# Patient Record
Sex: FEMALE | Race: OTHER | HISPANIC OR LATINO | URBAN - METROPOLITAN AREA
[De-identification: names, ages, dates, MRNs, and addresses within clinical notes are randomized per-mention and may not be internally consistent; named-entity substitution may affect disease eponyms.]

---
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
## 2019-03-19 PROBLEM — Z00.00 ENCOUNTER FOR PREVENTIVE HEALTH EXAMINATION: Status: ACTIVE | Noted: 2019-03-19

## 2019-03-21 ENCOUNTER — ASOB RESULT (OUTPATIENT)
Age: 19
End: 2019-03-21

## 2019-03-21 ENCOUNTER — APPOINTMENT (OUTPATIENT)
Age: 19
End: 2019-03-21
Payer: MEDICAID

## 2019-03-21 PROCEDURE — 76816 OB US FOLLOW-UP PER FETUS: CPT

## 2019-04-18 ENCOUNTER — ASOB RESULT (OUTPATIENT)
Age: 19
End: 2019-04-18

## 2019-04-18 ENCOUNTER — APPOINTMENT (OUTPATIENT)
Dept: ANTEPARTUM | Facility: CLINIC | Age: 19
End: 2019-04-18
Payer: MEDICAID

## 2019-04-18 PROCEDURE — 76816 OB US FOLLOW-UP PER FETUS: CPT

## 2019-04-18 PROCEDURE — 76819 FETAL BIOPHYS PROFIL W/O NST: CPT

## 2019-05-17 ENCOUNTER — INPATIENT (INPATIENT)
Facility: HOSPITAL | Age: 19
LOS: 4 days | Discharge: HOME | End: 2019-05-22
Attending: OBSTETRICS & GYNECOLOGY | Admitting: OBSTETRICS & GYNECOLOGY
Payer: MEDICAID

## 2019-05-17 VITALS
TEMPERATURE: 98 F | HEART RATE: 90 BPM | SYSTOLIC BLOOD PRESSURE: 112 MMHG | DIASTOLIC BLOOD PRESSURE: 60 MMHG | RESPIRATION RATE: 17 BRPM

## 2019-05-17 DIAGNOSIS — O47.1 FALSE LABOR AT OR AFTER 37 COMPLETED WEEKS OF GESTATION: ICD-10-CM

## 2019-05-17 LAB
APPEARANCE UR: CLEAR — SIGNIFICANT CHANGE UP
BASOPHILS # BLD AUTO: 0 K/UL — SIGNIFICANT CHANGE UP (ref 0–0.2)
BASOPHILS NFR BLD AUTO: 0.1 % — SIGNIFICANT CHANGE UP (ref 0–2)
BILIRUB UR-MCNC: NEGATIVE — SIGNIFICANT CHANGE UP
COLOR SPEC: YELLOW — SIGNIFICANT CHANGE UP
DIFF PNL FLD: NEGATIVE — SIGNIFICANT CHANGE UP
EOSINOPHIL # BLD AUTO: 0.1 K/UL — SIGNIFICANT CHANGE UP (ref 0–0.5)
EOSINOPHIL NFR BLD AUTO: 0.8 % — SIGNIFICANT CHANGE UP (ref 0–6)
GLUCOSE UR QL: NEGATIVE MG/DL — SIGNIFICANT CHANGE UP
HCT VFR BLD CALC: 40.1 % — SIGNIFICANT CHANGE UP (ref 37–47)
HGB BLD-MCNC: 13.4 G/DL — SIGNIFICANT CHANGE UP (ref 12–16)
KETONES UR-MCNC: NEGATIVE — SIGNIFICANT CHANGE UP
LEUKOCYTE ESTERASE UR-ACNC: NEGATIVE — SIGNIFICANT CHANGE UP
LYMPHOCYTES # BLD AUTO: 17.8 % — LOW (ref 20–55)
LYMPHOCYTES # BLD AUTO: 2 K/UL — SIGNIFICANT CHANGE UP (ref 1–4.8)
MCHC RBC-ENTMCNC: 30.1 PG — SIGNIFICANT CHANGE UP (ref 27–31)
MCHC RBC-ENTMCNC: 33.4 G/DL — SIGNIFICANT CHANGE UP (ref 32–36)
MCV RBC AUTO: 90.1 FL — SIGNIFICANT CHANGE UP (ref 81–99)
MONOCYTES # BLD AUTO: 1.3 K/UL — HIGH (ref 0–0.8)
MONOCYTES NFR BLD AUTO: 11.3 % — HIGH (ref 3–10)
NEUTROPHILS # BLD AUTO: 8 K/UL — SIGNIFICANT CHANGE UP (ref 1.8–8)
NEUTROPHILS NFR BLD AUTO: 69.7 % — SIGNIFICANT CHANGE UP (ref 37–73)
NITRITE UR-MCNC: NEGATIVE — SIGNIFICANT CHANGE UP
PH UR: 7 — SIGNIFICANT CHANGE UP (ref 5–8)
PLATELET # BLD AUTO: 241 K/UL — SIGNIFICANT CHANGE UP (ref 150–400)
PROT UR-MCNC: NEGATIVE MG/DL — SIGNIFICANT CHANGE UP
RBC # BLD: 4.45 M/UL — SIGNIFICANT CHANGE UP (ref 4.4–5.2)
RBC # FLD: 13.5 % — SIGNIFICANT CHANGE UP (ref 11–15.6)
SP GR SPEC: 1 — LOW (ref 1.01–1.02)
UROBILINOGEN FLD QL: NEGATIVE MG/DL — SIGNIFICANT CHANGE UP
WBC # BLD: 11.5 K/UL — HIGH (ref 4.8–10.8)
WBC # FLD AUTO: 11.5 K/UL — HIGH (ref 4.8–10.8)

## 2019-05-17 RX ORDER — OXYTOCIN 10 UNIT/ML
333.33 VIAL (ML) INJECTION
Qty: 20 | Refills: 0 | Status: DISCONTINUED | OUTPATIENT
Start: 2019-05-17 | End: 2019-05-17

## 2019-05-17 RX ORDER — CITRIC ACID/SODIUM CITRATE 300-500 MG
30 SOLUTION, ORAL ORAL ONCE
Refills: 0 | Status: DISCONTINUED | OUTPATIENT
Start: 2019-05-17 | End: 2019-05-17

## 2019-05-17 RX ORDER — SODIUM CHLORIDE 9 MG/ML
1000 INJECTION, SOLUTION INTRAVENOUS
Refills: 0 | Status: DISCONTINUED | OUTPATIENT
Start: 2019-05-17 | End: 2019-05-19

## 2019-05-17 RX ORDER — SODIUM CHLORIDE 9 MG/ML
1000 INJECTION, SOLUTION INTRAVENOUS
Refills: 0 | Status: DISCONTINUED | OUTPATIENT
Start: 2019-05-17 | End: 2019-05-17

## 2019-05-17 RX ORDER — CITRIC ACID/SODIUM CITRATE 300-500 MG
30 SOLUTION, ORAL ORAL ONCE
Refills: 0 | Status: COMPLETED | OUTPATIENT
Start: 2019-05-17 | End: 2019-05-19

## 2019-05-17 RX ORDER — OXYTOCIN 10 UNIT/ML
333.33 VIAL (ML) INJECTION
Qty: 20 | Refills: 0 | Status: DISCONTINUED | OUTPATIENT
Start: 2019-05-17 | End: 2019-05-22

## 2019-05-17 RX ADMIN — SODIUM CHLORIDE 125 MILLILITER(S): 9 INJECTION, SOLUTION INTRAVENOUS at 22:30

## 2019-05-17 NOTE — OB PROVIDER H&P - NSHPPHYSICALEXAM_GEN_ALL_CORE
Vital Signs Last 24 Hrs  T(C): 36.8 (17 May 2019 22:06), Max: 36.8 (17 May 2019 21:32)  T(F): 98.3 (17 May 2019 22:06), Max: 98.3 (17 May 2019 21:32)  HR: 90 (17 May 2019 22:06) (90 - 90)  BP: 112/60 (17 May 2019 22:06) (112/60 - 112/60)  RR: 18 (17 May 2019 22:06) (17 - 18)    Gen: NAD, lying in bed  CV: rrr, nl s1s2  Lungs: CTABL   Abd: soft, gravid, NT  Ext: no cyanosis, no edema  Speculum: pooling noted, cervix appears closed, no bleeding noted  CE: closed/long/posterior   Fhr: baseline 140 mod viet, +accels, no decels  [place]: irregular ctx  Sono: vertex  Nitrazine positive

## 2019-05-17 NOTE — OB PROVIDER H&P - HISTORY OF PRESENT ILLNESS
18 yo  at 39 weeks, LMP 08/10/2018, presents to L&D complaining of leakage of fluids since this morning (6am), accompanied by vaginal bleeding, reports the fluid is clear and reports having lower back pain but no contractions. Denies any fever, chills, dysuria, reports + FM.   Pmhx: none  Pshx: none  Allergies: none  Meds: PNV  Prenatal: HRH  Family: noncontributory

## 2019-05-17 NOTE — OB PROVIDER H&P - ASSESSMENT
A 18 yo  at 39 weeks, presents to L&D c/o leakage of fluid and vaginal bleeding, being admitted for PROM, + Nitrazine test and pooling noted in speculum exam    Plan  -Admit to L&D, continue to monitor vs, fhr, toco  -Routine blood work pending  -Re-eval PRN  -Will start induction with Cytotec    D/w Dr. Marcus

## 2019-05-17 NOTE — OB RN TRIAGE NOTE - CHIEF COMPLAINT QUOTE
"This morning when I woke up and had water coming down that I couldn't stop and I have bleeding around 630pm"

## 2019-05-18 ENCOUNTER — TRANSCRIPTION ENCOUNTER (OUTPATIENT)
Age: 19
End: 2019-05-18

## 2019-05-18 LAB
ABO RH CONFIRMATION: SIGNIFICANT CHANGE UP
BLD GP AB SCN SERPL QL: SIGNIFICANT CHANGE UP
TYPE + AB SCN PNL BLD: SIGNIFICANT CHANGE UP

## 2019-05-18 RX ORDER — AMPICILLIN TRIHYDRATE 250 MG
1 CAPSULE ORAL EVERY 4 HOURS
Refills: 0 | Status: DISCONTINUED | OUTPATIENT
Start: 2019-05-18 | End: 2019-05-18

## 2019-05-18 RX ORDER — OXYTOCIN 10 UNIT/ML
2 VIAL (ML) INJECTION
Qty: 30 | Refills: 0 | Status: DISCONTINUED | OUTPATIENT
Start: 2019-05-18 | End: 2019-05-19

## 2019-05-18 RX ORDER — AMPICILLIN TRIHYDRATE 250 MG
2 CAPSULE ORAL ONCE
Refills: 0 | Status: DISCONTINUED | OUTPATIENT
Start: 2019-05-18 | End: 2019-05-18

## 2019-05-18 RX ADMIN — SODIUM CHLORIDE 125 MILLILITER(S): 9 INJECTION, SOLUTION INTRAVENOUS at 20:00

## 2019-05-18 RX ADMIN — SODIUM CHLORIDE 125 MILLILITER(S): 9 INJECTION, SOLUTION INTRAVENOUS at 05:00

## 2019-05-18 RX ADMIN — Medication 2 MILLIUNIT(S)/MIN: at 19:08

## 2019-05-18 NOTE — OB RN DELIVERY SUMMARY - NSDELIVERYTYPEA_OBGYN_ALL_OB
Grant Lynn's mother called clinic at 11:09 this morning to cancel his appointment with Danielle Ritchie that was scheduled for 11:30.  Writer explained again the no-show policy and that we request a two hour notice of cancellation.  Caller stated she was unaware of this and apologized.  Writer also explained that habitual issues for late cancels could result in dismissal, as it has in the past with other providers as well.  Caller stated understanding.    Delivery

## 2019-05-18 NOTE — CHART NOTE - NSCHARTNOTEFT_GEN_A_CORE
IOl for prom  on po cytotec  minimal pain  cx clp on admission  cx 2/50  cat 1  dc cytotec  start pitocin.

## 2019-05-18 NOTE — CHART NOTE - NSCHARTNOTEFT_GEN_A_CORE
Pt is seen and examined at bedside, conformable resting in bed, reports mild pain, no pain medication at this time    Vital Signs Last 24 Hrs  T(C): 36.7 (18 May 2019 01:55), Max: 36.8 (17 May 2019 21:32)  T(F): 98.06 (18 May 2019 01:55), Max: 98.3 (17 May 2019 21:32)  HR: 76 (18 May 2019 01:55) (76 - 90)  BP: 111/59 (18 May 2019 01:55) (108/56 - 115/56)  RR: 17 (18 May 2019 01:55) (17 - 18)    FETAL HEART RATE: 1340, moderate variability, + accelerations, no deceleration,   [place]: contractions irregular  CERVICAL EXAM: Deferred    INTERVENTIONS:   -c/w Cytotec 40mg 0/3   -Re asses PRN  -Vaginal delivery expected    D/w Dr. Marcus.

## 2019-05-19 ENCOUNTER — TRANSCRIPTION ENCOUNTER (OUTPATIENT)
Age: 19
End: 2019-05-19

## 2019-05-19 DIAGNOSIS — O36.8390 MATERNAL CARE FOR ABNORMALITIES OF THE FETAL HEART RATE OR RHYTHM, UNSPECIFIED TRIMESTER, NOT APPLICABLE OR UNSPECIFIED: ICD-10-CM

## 2019-05-19 LAB
BASOPHILS # BLD AUTO: 0 K/UL — SIGNIFICANT CHANGE UP (ref 0–0.2)
BASOPHILS NFR BLD AUTO: 0.1 % — SIGNIFICANT CHANGE UP (ref 0–2)
EOSINOPHIL # BLD AUTO: 0 K/UL — SIGNIFICANT CHANGE UP (ref 0–0.5)
EOSINOPHIL NFR BLD AUTO: 0.2 % — SIGNIFICANT CHANGE UP (ref 0–6)
HCT VFR BLD CALC: 37.9 % — SIGNIFICANT CHANGE UP (ref 37–47)
HGB BLD-MCNC: 12.5 G/DL — SIGNIFICANT CHANGE UP (ref 12–16)
LYMPHOCYTES # BLD AUTO: 1 K/UL — SIGNIFICANT CHANGE UP (ref 1–4.8)
LYMPHOCYTES # BLD AUTO: 6.8 % — LOW (ref 20–55)
MCHC RBC-ENTMCNC: 30 PG — SIGNIFICANT CHANGE UP (ref 27–31)
MCHC RBC-ENTMCNC: 33 G/DL — SIGNIFICANT CHANGE UP (ref 32–36)
MCV RBC AUTO: 91.1 FL — SIGNIFICANT CHANGE UP (ref 81–99)
MEV IGG SER-ACNC: 7.1 AU/ML — SIGNIFICANT CHANGE UP
MEV IGG+IGM SER-IMP: NEGATIVE — SIGNIFICANT CHANGE UP
MONOCYTES # BLD AUTO: 1.3 K/UL — HIGH (ref 0–0.8)
MONOCYTES NFR BLD AUTO: 8.8 % — SIGNIFICANT CHANGE UP (ref 3–10)
NEUTROPHILS # BLD AUTO: 12.2 K/UL — HIGH (ref 1.8–8)
NEUTROPHILS NFR BLD AUTO: 83.8 % — HIGH (ref 37–73)
PLATELET # BLD AUTO: 215 K/UL — SIGNIFICANT CHANGE UP (ref 150–400)
RBC # BLD: 4.16 M/UL — LOW (ref 4.4–5.2)
RBC # FLD: 13.7 % — SIGNIFICANT CHANGE UP (ref 11–15.6)
T PALLIDUM AB TITR SER: NEGATIVE — SIGNIFICANT CHANGE UP
WBC # BLD: 14.5 K/UL — HIGH (ref 4.8–10.8)
WBC # FLD AUTO: 14.5 K/UL — HIGH (ref 4.8–10.8)

## 2019-05-19 RX ORDER — GLYCERIN ADULT
1 SUPPOSITORY, RECTAL RECTAL AT BEDTIME
Refills: 0 | Status: DISCONTINUED | OUTPATIENT
Start: 2019-05-19 | End: 2019-05-22

## 2019-05-19 RX ORDER — SODIUM CHLORIDE 9 MG/ML
1000 INJECTION, SOLUTION INTRAVENOUS
Refills: 0 | Status: DISCONTINUED | OUTPATIENT
Start: 2019-05-19 | End: 2019-05-22

## 2019-05-19 RX ORDER — ACETAMINOPHEN 500 MG
1000 TABLET ORAL ONCE
Refills: 0 | Status: COMPLETED | OUTPATIENT
Start: 2019-05-19 | End: 2019-05-19

## 2019-05-19 RX ORDER — KETOROLAC TROMETHAMINE 30 MG/ML
30 SYRINGE (ML) INJECTION EVERY 6 HOURS
Refills: 0 | Status: DISCONTINUED | OUTPATIENT
Start: 2019-05-19 | End: 2019-05-21

## 2019-05-19 RX ORDER — ENOXAPARIN SODIUM 100 MG/ML
40 INJECTION SUBCUTANEOUS EVERY 24 HOURS
Refills: 0 | Status: DISCONTINUED | OUTPATIENT
Start: 2019-05-19 | End: 2019-05-22

## 2019-05-19 RX ORDER — SIMETHICONE 80 MG/1
80 TABLET, CHEWABLE ORAL EVERY 4 HOURS
Refills: 0 | Status: DISCONTINUED | OUTPATIENT
Start: 2019-05-19 | End: 2019-05-22

## 2019-05-19 RX ORDER — IBUPROFEN 200 MG
600 TABLET ORAL EVERY 6 HOURS
Refills: 0 | Status: COMPLETED | OUTPATIENT
Start: 2019-05-19 | End: 2020-04-16

## 2019-05-19 RX ORDER — KETOROLAC TROMETHAMINE 30 MG/ML
30 SYRINGE (ML) INJECTION ONCE
Refills: 0 | Status: DISCONTINUED | OUTPATIENT
Start: 2019-05-19 | End: 2019-05-19

## 2019-05-19 RX ORDER — ONDANSETRON 8 MG/1
4 TABLET, FILM COATED ORAL EVERY 6 HOURS
Refills: 0 | Status: DISCONTINUED | OUTPATIENT
Start: 2019-05-19 | End: 2019-05-22

## 2019-05-19 RX ORDER — OXYTOCIN 10 UNIT/ML
333.33 VIAL (ML) INJECTION
Qty: 20 | Refills: 0 | Status: DISCONTINUED | OUTPATIENT
Start: 2019-05-19 | End: 2019-05-22

## 2019-05-19 RX ORDER — DIPHENHYDRAMINE HCL 50 MG
25 CAPSULE ORAL EVERY 4 HOURS
Refills: 0 | Status: DISCONTINUED | OUTPATIENT
Start: 2019-05-19 | End: 2019-05-22

## 2019-05-19 RX ORDER — ACETAMINOPHEN 500 MG
975 TABLET ORAL EVERY 6 HOURS
Refills: 0 | Status: DISCONTINUED | OUTPATIENT
Start: 2019-05-19 | End: 2019-05-22

## 2019-05-19 RX ORDER — OXYCODONE HYDROCHLORIDE 5 MG/1
5 TABLET ORAL ONCE
Refills: 0 | Status: DISCONTINUED | OUTPATIENT
Start: 2019-05-19 | End: 2019-05-22

## 2019-05-19 RX ORDER — CEFAZOLIN SODIUM 1 G
2000 VIAL (EA) INJECTION ONCE
Refills: 0 | Status: COMPLETED | OUTPATIENT
Start: 2019-05-19 | End: 2019-05-19

## 2019-05-19 RX ORDER — AZITHROMYCIN 500 MG/1
500 TABLET, FILM COATED ORAL ONCE
Refills: 0 | Status: COMPLETED | OUTPATIENT
Start: 2019-05-19 | End: 2019-05-19

## 2019-05-19 RX ORDER — FENTANYL CITRATE 50 UG/ML
50 INJECTION INTRAVENOUS
Refills: 0 | Status: DISCONTINUED | OUTPATIENT
Start: 2019-05-19 | End: 2019-05-19

## 2019-05-19 RX ORDER — MAGNESIUM HYDROXIDE 400 MG/1
30 TABLET, CHEWABLE ORAL
Refills: 0 | Status: DISCONTINUED | OUTPATIENT
Start: 2019-05-19 | End: 2019-05-22

## 2019-05-19 RX ORDER — ONDANSETRON 8 MG/1
4 TABLET, FILM COATED ORAL ONCE
Refills: 0 | Status: DISCONTINUED | OUTPATIENT
Start: 2019-05-19 | End: 2019-05-19

## 2019-05-19 RX ORDER — LANOLIN
1 OINTMENT (GRAM) TOPICAL EVERY 6 HOURS
Refills: 0 | Status: DISCONTINUED | OUTPATIENT
Start: 2019-05-19 | End: 2019-05-22

## 2019-05-19 RX ORDER — BUTORPHANOL TARTRATE 2 MG/ML
2 INJECTION, SOLUTION INTRAMUSCULAR; INTRAVENOUS ONCE
Refills: 0 | Status: DISCONTINUED | OUTPATIENT
Start: 2019-05-19 | End: 2019-05-19

## 2019-05-19 RX ORDER — FENTANYL CITRATE 50 UG/ML
25 INJECTION INTRAVENOUS
Refills: 0 | Status: DISCONTINUED | OUTPATIENT
Start: 2019-05-19 | End: 2019-05-19

## 2019-05-19 RX ORDER — SODIUM CHLORIDE 9 MG/ML
1000 INJECTION INTRAMUSCULAR; INTRAVENOUS; SUBCUTANEOUS
Refills: 0 | Status: DISCONTINUED | OUTPATIENT
Start: 2019-05-19 | End: 2019-05-19

## 2019-05-19 RX ORDER — DIPHENHYDRAMINE HCL 50 MG
25 CAPSULE ORAL EVERY 6 HOURS
Refills: 0 | Status: DISCONTINUED | OUTPATIENT
Start: 2019-05-19 | End: 2019-05-22

## 2019-05-19 RX ORDER — DOCUSATE SODIUM 100 MG
100 CAPSULE ORAL
Refills: 0 | Status: DISCONTINUED | OUTPATIENT
Start: 2019-05-19 | End: 2019-05-22

## 2019-05-19 RX ORDER — NALOXONE HYDROCHLORIDE 4 MG/.1ML
0.1 SPRAY NASAL
Refills: 0 | Status: DISCONTINUED | OUTPATIENT
Start: 2019-05-19 | End: 2019-05-22

## 2019-05-19 RX ORDER — OXYCODONE HYDROCHLORIDE 5 MG/1
5 TABLET ORAL
Refills: 0 | Status: DISCONTINUED | OUTPATIENT
Start: 2019-05-19 | End: 2019-05-22

## 2019-05-19 RX ORDER — SODIUM CHLORIDE 9 MG/ML
1000 INJECTION, SOLUTION INTRAVENOUS
Refills: 0 | Status: DISCONTINUED | OUTPATIENT
Start: 2019-05-19 | End: 2019-05-19

## 2019-05-19 RX ORDER — TETANUS TOXOID, REDUCED DIPHTHERIA TOXOID AND ACELLULAR PERTUSSIS VACCINE, ADSORBED 5; 2.5; 8; 8; 2.5 [IU]/.5ML; [IU]/.5ML; UG/.5ML; UG/.5ML; UG/.5ML
0.5 SUSPENSION INTRAMUSCULAR ONCE
Refills: 0 | Status: DISCONTINUED | OUTPATIENT
Start: 2019-05-19 | End: 2019-05-22

## 2019-05-19 RX ADMIN — Medication 30 MILLILITER(S): at 02:12

## 2019-05-19 RX ADMIN — BUTORPHANOL TARTRATE 2 MILLIGRAM(S): 2 INJECTION, SOLUTION INTRAMUSCULAR; INTRAVENOUS at 00:59

## 2019-05-19 RX ADMIN — SODIUM CHLORIDE 125 MILLILITER(S): 9 INJECTION, SOLUTION INTRAVENOUS at 16:30

## 2019-05-19 RX ADMIN — SODIUM CHLORIDE 125 MILLILITER(S): 9 INJECTION, SOLUTION INTRAVENOUS at 02:47

## 2019-05-19 RX ADMIN — Medication 30 MILLIGRAM(S): at 23:35

## 2019-05-19 RX ADMIN — BUTORPHANOL TARTRATE 2 MILLIGRAM(S): 2 INJECTION, SOLUTION INTRAMUSCULAR; INTRAVENOUS at 01:29

## 2019-05-19 RX ADMIN — Medication 30 MILLIGRAM(S): at 17:24

## 2019-05-19 RX ADMIN — Medication 30 MILLIGRAM(S): at 17:39

## 2019-05-19 RX ADMIN — ENOXAPARIN SODIUM 40 MILLIGRAM(S): 100 INJECTION SUBCUTANEOUS at 20:19

## 2019-05-19 RX ADMIN — AZITHROMYCIN 255 MILLIGRAM(S): 500 TABLET, FILM COATED ORAL at 08:45

## 2019-05-19 RX ADMIN — Medication 400 MILLIGRAM(S): at 09:55

## 2019-05-19 RX ADMIN — Medication 30 MILLIGRAM(S): at 23:20

## 2019-05-19 RX ADMIN — Medication 100 MILLIGRAM(S): at 08:38

## 2019-05-19 NOTE — DISCHARGE NOTE OB - HOSPITAL COURSE
18yo  delivered via  section. She was transferred to postpartum unit without complications during her stay. Upon discharge she is voiding, tolerating PO, ambulating, and pain is controlled.

## 2019-05-19 NOTE — DISCHARGE NOTE OB - MEDICATION SUMMARY - MEDICATIONS TO TAKE
I will START or STAY ON the medications listed below when I get home from the hospital:    ibuprofen 600 mg oral tablet  -- 1 tab(s) by mouth every 6 hours  -- Indication: For mild to moderate pain    Percocet 5/325 oral tablet  -- 1 tab(s) by mouth every 6 hours MDD:4  -- Caution federal law prohibits the transfer of this drug to any person other  than the person for whom it was prescribed.  May cause drowsiness.  Alcohol may intensify this effect.  Use care when operating dangerous machinery.  This prescription cannot be refilled.  This product contains acetaminophen.  Do not use  with any other product containing acetaminophen to prevent possible liver damage.  Using more of this medication than prescribed may cause serious breathing problems.    -- Indication: For severe pain

## 2019-05-19 NOTE — DISCHARGE NOTE OB - MATERIALS PROVIDED
Vaccinations/[facility]  Screening Program/Back To Sleep Handout/  Immunization Record/[facility] Hearing Screen Program/Breastfeeding Log/Breastfeeding Mother’s Support Group Information/Guide to Postpartum Care

## 2019-05-19 NOTE — OB PROVIDER DELIVERY SUMMARY - NSPROVIDERDELIVERYNOTE_OBGYN_ALL_OB_FT
patient with category II tracing during labor. amnioinfusion started for variable decels and then patient started to have late decels and eventually bradycardia that would not resolved. c/section called and patient and  notified of urgency of delivery  pfannensteil incision made with scalpel and peritoneum entered bluntly with no adhesions. low transverse incision made with scalpel. atraumatic delivery of the infant in op position. 40 units of pitocin and 1 dose of methergine were given for uterine atony.  uterus repaired in 2 layers and returned to abdomen and surgicell placed on uterine incision followed by rectus muscle repair. fascia reapproximated.  skin close din subcuticular manner.  ebl 600cc  apgar 8/9  male infant  baby to NICU due to unstable heart rate

## 2019-05-19 NOTE — CHART NOTE - NSCHARTNOTEFT_GEN_A_CORE
20 y/o  at 39w2d admitted for PROM.   Patient examined at bedside. Reporting increased pain and requesting IV pain medication.     Vital Signs Last 24 Hrs  T(C): 36.8 (19 May 2019 00:25), Max: 37.2 (18 May 2019 17:41)  T(F): 98.24 (19 May 2019 00:25), Max: 98.96 (18 May 2019 17:41)  HR: 70 (19 May 2019 00:25) (70 - 90)  BP: 111/70 (19 May 2019 00:25) (107/64 - 126/62)  BP(mean): --  RR: 18 (19 May 2019 00:25) (16 - 19)  SpO2: --    FHT: baseline 140, moderate variability, +accels, no decels   [place]: nathan irregularly   SVE: 2-3/70/-2    Will give 2mg of Stadol for pain control.     A/P: Will continue to monitor FHT/[place] and reassess for cervical change when clinically indicated.

## 2019-05-19 NOTE — OB NEONATOLOGY/PEDIATRICIAN DELIVERY SUMMARY - NSPEDSNEONOTESA_OBGYN_ALL_OB_FT
Requested by Dr Frank to attend a PC/S of a    woman at weeks GA secondary to a Cat fetal Tracing.  She had + PNC, is O pos, HBSAg neg, HIV neg, RPR NR, Rubella Immune, GBS neg.  L&R: Requested by Dr Frank to attend a PC/S of a 18 y/o  woman at 39.2 weeks GA secondary to a Cat 2 fetal HR Tracing.  She had + PNC, is O pos, HBSAg neg, HIV neg, RPR NR, Rubella Immune, GBS neg.  L&R:  SROM aprox 39 hrs PTD with clear amniotic fluids.  No maternal fever.  Baby born vertex with spontaneous cry, transferred to warmer, orally suctioned, dried, and, examined.  Baby noted to have a HR that was in the 150's but then decreased to 80's when baby was calm.  Baby was stimulated and HR again increased to 150's. Infant noted to be dusky, O2 sat 30% in RA, CPAP +5 was started and FIO2 increased in order to obtain appropriate O2 sats for age.  During th first 5 mins of life baby required tactile stim in order to keep his HR >100. Then he finally maintained his HR>100 without any stimulation. CPAP +5 was continued since the baby was retracting, FIO2 decreased to 40%.  Baby was showed to father and then transferred to NICU for further evaluation and management.

## 2019-05-19 NOTE — DISCHARGE NOTE OB - CARE PROVIDER_API CALL
Leslie Singh)  Obstetrics and Gynecology  [address] [place] [address]  Phone: [phone]  Fax: [phone]  Follow Up Time:

## 2019-05-19 NOTE — DISCHARGE NOTE OB - CARE PLAN
Principal Discharge DX:	 delivery delivered  Goal:	rapid recovery  Assessment and plan of treatment:	Assessment and Plan of Treatment: Please call your provider to schedule postoperative wound check visit in 1-2 weeks. Take medications as directed, regular diet, activity as tolerated. Exclusive breast feeding for the first 6 months is recommended. Nothing per vagina for 6 weeks (incl. sex, douching, etc). If you have additional concerns, please inform your provider.

## 2019-05-19 NOTE — CHART NOTE - NSCHARTNOTEFT_GEN_A_CORE
Pt seen and examined at bedside. Feels comfort s/p epidural    FHT: Cat 1  [place]: q3-4m  SVE: 4/80/-3    Continue Augmentation w/ Pitocin    D/W Dr. Singh

## 2019-05-19 NOTE — BRIEF OPERATIVE NOTE - NSICDXBRIEFPOSTOP_GEN_ALL_CORE_FT
POST-OP DIAGNOSIS:  Bradycardia found on measurement of baseline fetal heart rate 19-May-2019 10:38:56  Blanca Frank

## 2019-05-19 NOTE — CHART NOTE - NSCHARTNOTEFT_GEN_A_CORE
Pt having recurrent variable decelerations with moderate variability in between.  IUPC placed, amnioinfusion with normal saline started.  SVE: 4/80/-3  Cat 2 tracing    Continue augmentation with Pitocin    D/w Dr. Frank

## 2019-05-20 RX ADMIN — Medication 975 MILLIGRAM(S): at 06:49

## 2019-05-20 RX ADMIN — Medication 975 MILLIGRAM(S): at 14:09

## 2019-05-20 RX ADMIN — Medication 30 MILLIGRAM(S): at 05:38

## 2019-05-20 RX ADMIN — ENOXAPARIN SODIUM 40 MILLIGRAM(S): 100 INJECTION SUBCUTANEOUS at 20:04

## 2019-05-20 RX ADMIN — Medication 975 MILLIGRAM(S): at 00:58

## 2019-05-20 RX ADMIN — Medication 975 MILLIGRAM(S): at 18:51

## 2019-05-20 RX ADMIN — Medication 30 MILLIGRAM(S): at 13:30

## 2019-05-20 RX ADMIN — Medication 30 MILLIGRAM(S): at 13:09

## 2019-05-20 RX ADMIN — Medication 975 MILLIGRAM(S): at 13:09

## 2019-05-20 RX ADMIN — Medication 975 MILLIGRAM(S): at 19:30

## 2019-05-20 RX ADMIN — Medication 30 MILLIGRAM(S): at 05:23

## 2019-05-20 RX ADMIN — Medication 975 MILLIGRAM(S): at 00:24

## 2019-05-20 RX ADMIN — Medication 30 MILLIGRAM(S): at 19:05

## 2019-05-20 RX ADMIN — Medication 975 MILLIGRAM(S): at 06:19

## 2019-05-20 RX ADMIN — Medication 30 MILLIGRAM(S): at 18:51

## 2019-05-20 NOTE — PROGRESS NOTE ADULT - SUBJECTIVE AND OBJECTIVE BOX
Postpartum Note,  Section  Patient is a 19y  s/p  post-operative day 1.    Subjective:  No acute events overnight. The patient is feeling well.   She is tolerating a diet and denies N/V.    Patient is having normal postpartum bleeding which is decreasing in amount.    She is breastfeeding and the baby is latching on.    Urinating appropriately.   -BM/+flatus.    Physical exam:    Vital Signs Last 24 Hrs  T(C): 36.7 (20 May 2019 04:14), Max: 37.3 (19 May 2019 07:22)  T(F): 98 (20 May 2019 04:14), Max: 99.14 (19 May 2019 07:22)  HR: 74 (20 May 2019 04:14) (68 - 113)  BP: 100/62 (20 May 2019 04:14) (84/65 - 135/63)  RR: 18 (20 May 2019 04:14) (16 - 24)  SpO2: 98% (20 May 2019 04:14) (96% - 100%)    Heart: RRR  Lungs: CTABL  Breast: non tender, not engorged   Abdomen: Soft, nontender, no distension, firm uterine fundus, the dressing is removed, the incision is clean dry and intact  Ext: No DVT signs, warm extremities    LABS:                        12.5   14.5  )-----------( 215      ( 19 May 2019 18:48 )             37.9

## 2019-05-21 RX ADMIN — Medication 975 MILLIGRAM(S): at 07:16

## 2019-05-21 RX ADMIN — Medication 30 MILLIGRAM(S): at 00:38

## 2019-05-21 RX ADMIN — Medication 975 MILLIGRAM(S): at 01:48

## 2019-05-21 RX ADMIN — Medication 975 MILLIGRAM(S): at 06:32

## 2019-05-21 RX ADMIN — Medication 975 MILLIGRAM(S): at 01:15

## 2019-05-21 RX ADMIN — Medication 975 MILLIGRAM(S): at 18:09

## 2019-05-21 RX ADMIN — Medication 30 MILLIGRAM(S): at 06:04

## 2019-05-21 RX ADMIN — Medication 30 MILLIGRAM(S): at 00:23

## 2019-05-21 RX ADMIN — Medication 30 MILLIGRAM(S): at 05:49

## 2019-05-21 NOTE — PROGRESS NOTE ADULT - SUBJECTIVE AND OBJECTIVE BOX
19y year old  s/p primary  for non reassuring FHT at 39.2wks gestation POD#2.   Patient seen and examined at bedside, no acute overnight events.   Patient is ambulating, +eating, +PO hydration, +voiding, +Flatus, -BM, +Formula feeding, +Breast feeding and pain is well controlled.  Denies headache, SOB, fever, chills and calf pain.     Vital Signs Last 24 Hrs  T(C): 36.7 (20 May 2019 19:22), Max: 36.7 (20 May 2019 19:22)  T(F): 98.1 (20 May 2019 19:22), Max: 98.1 (20 May 2019 19:22)  HR: 91 (20 May 2019 19:22) (91 - 95)  BP: 112/68 (20 May 2019 19:22) (100/64 - 112/68)  RR: 18 (20 May 2019 19:22) (18 - 18)  SpO2: 98% (20 May 2019 19:22) (98% - 98%)    Physical Exam:  General: NAD  CVS: RRR, +S1/S2  Lungs: CTAB, no wheeze, rhonchi or rales.   Abdomen: +BS, soft, ND, minimally tender, Fundus firm at level of umbilicus. Suture noted, clean incision site, healing well.  Pelvic: Minimal lochia  Ext: No cyanosis, edema or calf tenderness.     Labs:                        12.5   14.5  )-----------( 215      ( 19 May 2019 18:48 )             37.9         Medication:  MEDICATIONS  (STANDING):  acetaminophen   Tablet .. 975 milliGRAM(s) Oral every 6 hours  diphtheria/tetanus/pertussis (acellular) Vaccine (ADAcel) 0.5 milliLiter(s) IntraMuscular once  enoxaparin Injectable 40 milliGRAM(s) SubCutaneous every 24 hours  ibuprofen  Tablet. 600 milliGRAM(s) Oral every 6 hours  ketorolac   Injectable 30 milliGRAM(s) IV Push every 6 hours  lactated ringers. 1000 milliLiter(s) (125 mL/Hr) IV Continuous <Continuous>  oxytocin Infusion 333.333 milliUNIT(s)/Min (1000 mL/Hr) IV Continuous <Continuous>  oxytocin Infusion 333.333 milliUNIT(s)/Min (1000 mL/Hr) IV Continuous <Continuous>    MEDICATIONS  (PRN):  diphenhydrAMINE 25 milliGRAM(s) Oral every 4 hours PRN Pruritus  diphenhydrAMINE 25 milliGRAM(s) Oral every 6 hours PRN Itching  docusate sodium 100 milliGRAM(s) Oral two times a day PRN Stool softening  glycerin Suppository - Adult 1 Suppository(s) Rectal at bedtime PRN Constipation  lanolin Ointment 1 Application(s) Topical every 6 hours PRN Sore Nipples  magnesium hydroxide Suspension 30 milliLiter(s) Oral two times a day PRN Constipation  naloxone Injectable 0.1 milliGRAM(s) IV Push every 3 minutes PRN For ANY of the following changes in patient status:  A. RR LESS THAN 10 breaths per minute, B. Oxygen saturation LESS THAN 90%, C. Sedation score of 6  ondansetron Injectable 4 milliGRAM(s) IV Push every 6 hours PRN Nausea  oxyCODONE    IR 5 milliGRAM(s) Oral every 3 hours PRN Moderate Pain (4 - 6)  oxyCODONE    IR 5 milliGRAM(s) Oral once PRN Severe Pain (7 - 10)  simethicone 80 milliGRAM(s) Chew every 4 hours PRN Gas

## 2019-05-21 NOTE — PROGRESS NOTE ADULT - ATTENDING COMMENTS
post  day # 1  no complaints  exam wnl  labs reviewed    plan  routine care  discussed breastfeeding
Post  day # 2  no complaints  exam wnl  labs reviewed    Plan   routine care  discussed contraception, vaccination, breastfeeding  24hr dc

## 2019-05-21 NOTE — PROGRESS NOTE ADULT - PROBLEM SELECTOR PLAN 1
- Continue with current management  - Encourage ambulation and hydration  - c/w Lovenox 40 mg SQ qd  - Encourage mother/baby interaction and breast feeding  - Plan for D/C on POD#3

## 2019-05-22 VITALS
HEART RATE: 105 BPM | DIASTOLIC BLOOD PRESSURE: 70 MMHG | SYSTOLIC BLOOD PRESSURE: 117 MMHG | RESPIRATION RATE: 18 BRPM | TEMPERATURE: 99 F

## 2019-05-22 RX ORDER — IBUPROFEN 200 MG
1 TABLET ORAL
Qty: 30 | Refills: 0
Start: 2019-05-22

## 2019-05-22 RX ORDER — IBUPROFEN 200 MG
600 TABLET ORAL EVERY 6 HOURS
Refills: 0 | Status: DISCONTINUED | OUTPATIENT
Start: 2019-05-22 | End: 2019-05-22

## 2019-05-22 RX ADMIN — Medication 975 MILLIGRAM(S): at 01:08

## 2019-05-22 RX ADMIN — ENOXAPARIN SODIUM 40 MILLIGRAM(S): 100 INJECTION SUBCUTANEOUS at 01:07

## 2019-05-22 RX ADMIN — Medication 975 MILLIGRAM(S): at 06:46

## 2019-05-22 RX ADMIN — Medication 975 MILLIGRAM(S): at 07:30

## 2019-05-22 NOTE — PROGRESS NOTE ADULT - SUBJECTIVE AND OBJECTIVE BOX
Subjective:  The patient feels well.  She is ambulating without difficulty.  She is tolerating PO.  She is voiding.  She denies nausea and vomiting.  Her pain is controlled.  She reports normal postpartum bleeding      Physical exam:    Vital Signs Last 24 Hrs  T(C): 36.7 (21 May 2019 20:17), Max: 37.1 (21 May 2019 09:01)  T(F): 98.1 (21 May 2019 20:17), Max: 98.7 (21 May 2019 09:01)  HR: 86 (21 May 2019 20:17) (86 - 92)  BP: 112/70 (21 May 2019 20:17) (108/78 - 112/70)  BP(mean): --  RR: 16 (21 May 2019 20:17) (16 - 18)  SpO2: --    Gen: NAD  Breast :  Abdomen: Soft, nontender, no distension , firm uterine fundus at umbilicus.  Incision: Clean, dry, and intact with steri strips  Pelvic: Normal lochia noted  Ext: No calf tenderness    LABS:    blood type    A/P POD # 3 s/p    Doing well.  Encourage ambulation.  Discharge home today in satisfactory condition.  Prescriptions for percocet and motrin electronically sent to the pharmacy.  F/U in 1 weeks for incision check.  Call for fevers, chills, nausea, vomiting, heavy vaginal bleeding, vaginal discharge, severe pain, symptoms of depression, problems with incision or any other concerning symptoms.  Nothing in vagina and no heavy lifting x 6 weeks.  No driving x 2 weeks.  Questions answered.

## 2019-05-22 NOTE — PROGRESS NOTE ADULT - SUBJECTIVE AND OBJECTIVE BOX
19y year old  s/p primary  for non reassuring FHT at 39.2wks gestation POD#3.   Patient seen and examined at bedside, no acute overnight events.   Patient is ambulating, +eating, +PO hydration, +voiding, +Flatus, +BM, +Formula feeding, +Breast feeding and pain is well controlled.  Denies headache, SOB, fever, chills and calf pain.     Vital Signs Last 24 Hrs  T(C): 36.7 (21 May 2019 20:17), Max: 37.1 (21 May 2019 09:01)  T(F): 98.1 (21 May 2019 20:17), Max: 98.7 (21 May 2019 09:01)  HR: 86 (21 May 2019 20:17) (86 - 92)  BP: 112/70 (21 May 2019 20:17) (108/78 - 112/70)  RR: 16 (21 May 2019 20:17) (16 - 18)    Physical Exam:  General: NAD  CVS: RRR, +S1/S2  Lungs: CTAB, no wheeze, rhonchi or rales.   Abdomen: +BS, soft, ND, minimally tender, Fundus firm at level of umbilicus. Suture noted, clean incision site, healing well.  Pelvic: Minimal lochia  Ext: No cyanosis, edema or calf tenderness.     Labs:                        12.5   14.5  )-----------( 215      ( 19 May 2019 18:48 )             37.9         Medication:  MEDICATIONS  (STANDING):  acetaminophen   Tablet .. 975 milliGRAM(s) Oral every 6 hours  diphtheria/tetanus/pertussis (acellular) Vaccine (ADAcel) 0.5 milliLiter(s) IntraMuscular once  enoxaparin Injectable 40 milliGRAM(s) SubCutaneous every 24 hours  ibuprofen  Tablet. 600 milliGRAM(s) Oral every 6 hours  ketorolac   Injectable 30 milliGRAM(s) IV Push every 6 hours  lactated ringers. 1000 milliLiter(s) (125 mL/Hr) IV Continuous <Continuous>  oxytocin Infusion 333.333 milliUNIT(s)/Min (1000 mL/Hr) IV Continuous <Continuous>  oxytocin Infusion 333.333 milliUNIT(s)/Min (1000 mL/Hr) IV Continuous <Continuous>    MEDICATIONS  (PRN):  diphenhydrAMINE 25 milliGRAM(s) Oral every 4 hours PRN Pruritus  diphenhydrAMINE 25 milliGRAM(s) Oral every 6 hours PRN Itching  docusate sodium 100 milliGRAM(s) Oral two times a day PRN Stool softening  glycerin Suppository - Adult 1 Suppository(s) Rectal at bedtime PRN Constipation  lanolin Ointment 1 Application(s) Topical every 6 hours PRN Sore Nipples  magnesium hydroxide Suspension 30 milliLiter(s) Oral two times a day PRN Constipation  naloxone Injectable 0.1 milliGRAM(s) IV Push every 3 minutes PRN For ANY of the following changes in patient status:  A. RR LESS THAN 10 breaths per minute, B. Oxygen saturation LESS THAN 90%, C. Sedation score of 6  ondansetron Injectable 4 milliGRAM(s) IV Push every 6 hours PRN Nausea  oxyCODONE    IR 5 milliGRAM(s) Oral every 3 hours PRN Moderate Pain (4 - 6)  oxyCODONE    IR 5 milliGRAM(s) Oral once PRN Severe Pain (7 - 10)  simethicone 80 milliGRAM(s) Chew every 4 hours PRN Gas

## 2019-07-10 PROCEDURE — 85027 COMPLETE CBC AUTOMATED: CPT

## 2019-07-10 PROCEDURE — 86780 TREPONEMA PALLIDUM: CPT

## 2019-07-10 PROCEDURE — T1013: CPT

## 2019-07-10 PROCEDURE — 36415 COLL VENOUS BLD VENIPUNCTURE: CPT

## 2019-07-10 PROCEDURE — 81003 URINALYSIS AUTO W/O SCOPE: CPT

## 2019-07-10 PROCEDURE — 86901 BLOOD TYPING SEROLOGIC RH(D): CPT

## 2019-07-10 PROCEDURE — 86900 BLOOD TYPING SEROLOGIC ABO: CPT

## 2019-07-10 PROCEDURE — 86850 RBC ANTIBODY SCREEN: CPT

## 2019-07-10 PROCEDURE — 86765 RUBEOLA ANTIBODY: CPT

## 2021-02-05 NOTE — DISCHARGE NOTE OB - MEDICATION SUMMARY - MEDICATIONS TO CHANGE
yes I will SWITCH the dose or number of times a day I take the medications listed below when I get home from the hospital:  None

## 2023-01-25 NOTE — OB RN PATIENT PROFILE - PAIN RATING/NUMBER SCALE (0-10): ACTIVITY
0 Tranexamic Acid Counseling:  Patient advised of the small risk of bleeding problems with tranexamic acid. They were also instructed to call if they developed any nausea, vomiting or diarrhea. All of the patient's questions and concerns were addressed.

## 2023-07-26 NOTE — ED ADULT NURSE NOTE - CARDIO WDL
B Complex    No protocol for requested medication     Last office visit date: 9/19/22, next appointment 9/20/23  Preferred pharmacy: [facility]    Order pended, routed to clinician for review.      Normal rate, regular rhythm, normal S1, S2 heart sounds heard.

## 2024-05-10 NOTE — ED PROVIDER NOTE - ENMT, MLM
Thank you for coming to Urgent Care today. It was a pleasure caring for you!  MARK Saleh     We have sent urine for culture and we will call you with the results.  Take cefdinir  twice a day for 7 days.  Advised to push fluids.  Avoid holding urine.  Take pyridium up to 3 times per day as needed.  Consider probiotic or yogurt while on antibiotics.  Refrain from intercourse, bubble baths.  Go to the hospital if you develop high fever, worsening  back pain, vomiting, are unable to eat or drink; otherwise follow up with primary doctor if no improvement.    Airway patent, Nasal mucosa clear. Mouth with normal mucosa. Throat has no vesicles, no oropharyngeal exudates and uvula is midline.

## 2024-08-13 NOTE — ED ADULT TRIAGE NOTE - ACCOMPANIED BY
Spoke with patient to move her office visit per the team and to make iron infusions, she had no further questions.    Self

## 2024-10-24 NOTE — ED PROVIDER NOTE - CONSTITUTIONAL, MLM
Monitored by specialist- no acute findings meriting change in the plan  Refill med  Orders:    metoprolol succinate (TOPROL XL) 25 MG extended release tablet; Take 1 tablet by mouth daily     normal... Well appearing, well nourished, awake, alert, oriented to person, place, time/situation and in no apparent distress.